# Patient Record
Sex: MALE | Race: BLACK OR AFRICAN AMERICAN
[De-identification: names, ages, dates, MRNs, and addresses within clinical notes are randomized per-mention and may not be internally consistent; named-entity substitution may affect disease eponyms.]

---

## 2017-09-07 NOTE — HP
Date/Time of Note


Date/Time of Note


DATE: 9/7/17 


TIME: 22:19





Assessment/Plan


VTE Prophylaxis


VTE Prophylaxis Intervention:  SCD's





Assessment/Plan


Chief Complaint/Hosp Course


This is a 62 year old male being admitted to the tele floor for:





#1 Suspected New onset CHF: BNP 2,360.  She with a history of congenital heart 

disease as a child.  I did not see an EKG in the transfer documentation will 

order repeat EKG. diffuse crackles/wheezing on lung exam. 2D echo in the AM. 

Cardio consult. Lasix 40 mg IV, patient prefers to receive it in the AM. 

BBlocker/ACE/ASA. Check lipid, tsh, a1c. 





#2 dvt and gi prophylaxis: scds, acid blocker





further treatment strategy as per the clinical course.


Problems:  





HPI/ROS


Admit Date/Time


Admit Date/Time


Sep 7, 2017 at 19:06





Hx of Present Illness


CC: cough x 1 week





This is a 62 year old male who was transferred from [facility] for likely new onset 

chf. Patient states he has a had a cough for about 1 week. Dry cough. Denies 

any fevers. Denies any shortness of breath. Denies any orthopnea. He thinks he 

has a bronchitis.  He does report that when he was a child he was diagnosed 

with an enlarged heart and had a surgery performed.





allergies: nkda





Meds:none





ROS


Const: Negative for fever, chills, weight gain or weight loss, fatigue, or 

diaphoresis


Eyes : No pain discharge or redness or change in visual acuity


ENT: No pain, sore throat, congestion, congestion,  dysphagia or discharge


Respiratory: As per HPI


Cardiovascular: No chest pain, palpitation, PND, or edema


GI : no change in appetite, abdominal pain, nausea, vomiting, diarrhea, 

constipation, or change in the color his stool 


Genitourinary: No dysuria, hematuria, flank pain ,  discharge or CVA tenderness


Musculoskeletal: No joint pain, back pain, neck pain, restricted range of 

motion in neck or joints


Skin: No rash, bruising or hives 


Neuro: No headache, dizziness, syncope, seizure, focal weakness


Endocrine: No polyuria, polydipsia, temperature intolerance


Psych: No hallucination, depression, anxiety or suicidal ideation





PMH/Family/Social


Past Medical History


history of congenital heart disease





Past Surgical History


states he did have some cardiac surgery as a child





Family History


Significant Family History:  no pertinent family hx





Social History


Alcohol Use:  rarely


Smoking Status:  Never smoker


Drug Use:  marijuana





Exam/Review of Systems


Vital Signs


Vitals





 Vital Signs








  Date Time  Temp Pulse Resp B/P Pulse Ox O2 Delivery O2 Flow Rate FiO2


 


9/7/17 20:20  100      


 


9/7/17 20:04 98.4  19 138/91 98   











Exam


Exam





General: Patient is well-developed well-nourished


The patient is alert oriented -3 lying comfortably in bed.


HEENT: Atraumatic, normocephalic. The pupils are equal, round and reactive. 

Extraocular motor are intact


Neck: Supple with full range of motion. No rigidity or meningismus


Chest: Nontender


Lungs: Bilateral crackles and wheezing


Heart: Normal S1-S2, Regular rhythm and rate.  No overt murmurs appreciated


Abdomen: Soft , nontender,  nondistended , bowel sounds are present. No 

guarding no rebound tenderness , No masses or organomegaly. No costovertebral 

temporal angle mass


Extremities: Normal to inspection, no edema no cyanosis


Neurologic: Normal mental status, speech normal, cranial nerves II through XII 

are intact, motor and sensory are intact, no focal weakness


Additional Comments


I do not see an EKG in the transfer documentation.  Will order a repeat EKG











MONTANA SOSA Sep 7, 2017 22:29

## 2017-09-08 NOTE — RADRPT
Echocardiogram Report

 

Patient Name:  ALEJANDRO LARSEN   Gender:       Male

MRN:           [id number]              Accession #:  [id number]

Birth Date:    1955          Study Date:   08-Sep-2017

Sonographer:   JUN Lucas [facility]         Location:     [id number]

 

Ref. Physician: MONTANA SOSA

Quality: Good

 

Procedures: Transthoracic echocardiogram with complete 2D, M-Mode, and 

doppler examination.

Indications: New onset CHF.

 

2D/M Mode                          Doppler

Measurement  Value  Normal Ranges  Measurement     Value  Normal Ranges

LVIDd 2D     6.6    3.5 - 5.6 cm   AV Peak Chris     0.9    m/sec

LVIDs 2D     5.0    2.1 - 4.1 cm   AV Peak PG      3.6    mmHg

LVPWd 2D     1.0    0.6 - 1.1 cm   LVOT Peak Chris   0.7    m/sec

IVSd 2D      1.1    0.6 - 1.1 cm   LVOT Peak PG    2.1    mmHg

AoR Diam 2D  2.9    2.0 - 3.7 cm   MV E Peak Chris   0.8    m/sec

EDV 2D       220.0  cm3            MV A Peak Chris   0.5    m/sec

ESV 2D       126.8  cm3            MV E/A          1.7

LA Dimen 2D  4.1    2.3 - 4.0 cm   MV Decel Time   146    msec

MV Decel Limestone  6

MV E/A          1.7

TR Peak Chris     2.6    m/sec

TR Peak PG      28.0   mmHg

RVSP            31.0   mmHg

 

Findings

Left Ventricle: Mild concentric left ventricular hypertrophy.  Moderate 

enlargement of left ventricle cavity.  Severe left ventricular systolic 

dysfunction.  Ejection fraction is visually estimated at 25 %.  Tissue 

Doppler/Mitral Doppler indices are consistent with pseudonormalization 

with mildly elevated left atrial pressure (Stage II diastolic 

dysfunction).

Right Ventricle: Normal right ventricular size.  Normal right 

ventricular systolic function.

Left Atrium: There is mild enlargement of left atrium.

Right Atrium: The right atrium is normal in size.

Mitral Valve: Mitral valve leaflets appear mildly thickened.  Mild 

mitral annular calcification.  Moderate mitral valve regurgitation.

Aortic Valve: Normal appearance of the aortic valve.  No significant 

aortic stenosis or insufficiency.

Tricuspid Valve: Normal appearance of the tricuspid valve.  Estimated 

peak PA systolic pressure 31 mmHg.  There is mild tricuspid 

regurgitation.

Pulmonic Valve: Pulmonic valve not well visualized.

Pericardium: Normal pericardium with no significant pericardial effusion.

Aorta: Normal aortic root.

IVC: Normal size and normal respiratory collapse consistent with normal 

right atrial pressure.

 

Conclusions

Mild concentric left ventricular hypertrophy.  Moderate enlargement of 

left ventricle cavity.  Severe left ventricular systolic dysfunction.  

Ejection fraction is visually estimated at 25 %.  Tissue Doppler/Mitral 

Doppler indices are consistent with pseudonormalization with mildly 

elevated left atrial pressure (Stage II diastolic dysfunction).

Normal right ventricular size.  Normal right ventricular systolic 

function.

There is mild enlargement of left atrium.

The right atrium is normal in size.

Moderate mitral valve regurgitation.

Estimated peak PA systolic pressure 31 mmHg.  There is mild tricuspid 

regurgitation.

No significant aortic stenosis or insufficiency.

Normal pericardium with no significant pericardial effusion.

 

Electronically Signed By:

Mika Garcia

08-Sep-2017 11:51:40  -0700

 

Patient Name: AMANDA LARSENKELLY

MRN: [id number]

Study Date: 08-Sep-2017

 

[id number]

## 2017-09-08 NOTE — RADRPT
PROCEDURE:   XR Chest.

 

CLINICAL INDICATION:   Congestive heart failure .

 

TECHNIQUE:   Single frontal chest x-ray. 

 

COMPARISON:   None.

 

FINDINGS:

The lungs are clear of acute infiltrates, edema, effusions, or masses..  Cardiomegaly is seen.. The 
osseous structures are intact.   

 

IMPRESSION:

No acute cardiopulmonary disease. 

Cardiomegaly.  

 

RPTAT: JJ

_____________________________________________ 

.Andrea Mcgregor MD, MD           Date    Time 

Electronically viewed and signed by .Andrea Mcgregor MD, MD on 09/08/2017 14:18 

 

D:  09/08/2017 14:18  T:  09/08/2017 14:18

.L/

## 2017-09-08 NOTE — PN
Date/Time of Note


Date/Time of Note


DATE: 9/8/17 


TIME: 16:19





Assessment/Plan


VTE Prophylaxis


VTE Prophylaxis Intervention:  LMWH





Lines/Catheters


IV Catheter Type (from Nrs):  Saline Lock


Urinary Cath still in place:  No





Assessment/Plan


Chief Complaint/Hosp Course


61 yo male who presents with acute onset systolic CHF exacerbation





Acute systolic CHF exacerbation:


- Volume status pretty good, can continue a bit more to euvolemia


- BB, ACE, aarti indicated


- New diagnosis for him, needs ischemic evaluation at some point


Problems:  





Subjective


24 Hr Interval Summary


Free Text/Dictation


Only complaint is of cough, minimally productive.  Gets adominal pain from 

splinting w cough


TTE shows EF 25%





Exam/Review of Systems


Vital Signs


Vitals





 Vital Signs








  Date Time  Temp Pulse Resp B/P Pulse Ox O2 Delivery O2 Flow Rate FiO2


 


9/8/17 14:56 98.0 88 19 107/66 100   











Exam


Well appearing, NAD, AOx3


JVD mildly eleavted


Tachy, regular, S4 present


Clear lugns, no wheezign


No perihperal edema





Results


Result Diagram:  


9/8/17 0700                                                                    

            9/8/17 0700





Results 24 hrs





Laboratory Tests








Test


  9/7/17


22:27 9/8/17


07:00


 


Creatine Kinase 120   104  


 


Creatine Kinase Index 0.8   1.2  


 


Creatinine Kinase MB (Mass) 1.01   1.21  


 


Troponin I < 0.012   < 0.012  


 


White Blood Count  17.3  H


 


Red Blood Count  5.14  


 


Hemoglobin  15.3  


 


Hematocrit  46.2  


 


Mean Corpuscular Volume  89.9  


 


Mean Corpuscular Hemoglobin  29.8  


 


Mean Corpuscular Hemoglobin


Concent 


  33.1  


 


 


Red Cell Distribution Width  12.2  


 


Platelet Count  242  


 


Mean Platelet Volume  10.9  H


 


Neutrophils %  88.2  H


 


Lymphocytes %  5.1  L


 


Monocytes %  6.1  


 


Eosinophils %  0.0  


 


Basophils %  0.1  


 


Nucleated Red Blood Cells %  0.0  


 


Neutrophils # (Manual)  15.3  H


 


Lymphocytes #  0.9  


 


Monocytes #  1.1  H


 


Eosinophils #  0.0  


 


Basophils #  0.0  


 


Nucleated Red Blood Cells #  0.0  


 


Sodium Level  138  


 


Potassium Level  4.1  


 


Chloride Level  100  


 


Carbon Dioxide Level  31  


 


Anion Gap  11  


 


Blood Urea Nitrogen  14  


 


Creatinine  0.96  


 


Glucose Level  128  


 


Hemoglobin A1c  5.5  


 


Calcium Level  9.4  


 


Magnesium Level  2.0  


 


Total Bilirubin  0.5  


 


Direct Bilirubin  0.00  


 


Indirect Bilirubin  0.5  


 


Aspartate Amino Transf


(AST/SGOT) 


  19  


 


 


Alanine Aminotransferase


(ALT/SGPT) 


  23  


 


 


Alkaline Phosphatase  87  


 


Total Protein  7.4  


 


Albumin  3.6  


 


Globulin  3.80  H


 


Albumin/Globulin Ratio  0.94  


 


Triglycerides Level  39  


 


Cholesterol Level  126  


 


LDL Cholesterol, Calculated  70  


 


HDL Cholesterol  48  


 


Cholesterol/HDL Ratio  2.6  


 


Thyroid Stimulating Hormone


(TSH) 


  0.461  L


 


 


Free Thyroxine  0.89  


 


Free Triiodothyronine (T3)


pg/mL 


  3.44  


 











Medications


Medications





 Current Medications


Ondansetron HCl (Zofran Inj) 4 mg Q6H  PRN IV NAUSEA AND/OR VOMITING;  Start 9/7 /17 at 22:30


Aspirin (Aspirin) 81 mg DAILY PO  Last administered on 9/8/17at 09:06; Admin 

Dose 81 MG;  Start 9/8/17 at 09:00


Docusate Sodium (Colace) 100 mg Q12H  PRN PO CONSTIPATION;  Start 9/7/17 at 22:

30


Bisacodyl (Dulcolax) 5 mg DAILY  PRN PO CONSTIPATION;  Start 9/7/17 at 22:30


Famotidine (Pepcid) 20 mg Q12 PO  Last administered on 9/8/17at 09:07; Admin 

Dose 20 MG;  Start 9/8/17 at 09:00


Carvedilol (Coreg) 3.125 mg BID PO  Last administered on 9/8/17at 09:07; Admin 

Dose 3.125 MG;  Start 9/7/17 at 22:30


Guaifenesin/ Dextromethorphan (Robitussin Dm Liquid Cup) 10 ml Q4H  PRN PO 

COUGH Last administered on 9/8/17at 09:08; Admin Dose 10 ML;  Start 9/7/17 at 22

:30


Furosemide (Lasix) 40 mg DAILY IV ;  Start 9/9/17 at 09:00


Lisinopril (Zestril) 5 mg BID PO ;  Start 9/9/17 at 09:00


Atorvastatin Calcium (Lipitor) 20 mg HS PO ;  Start 9/8/17 at 21:00


Promethazine HCl/ Codeine (Phenergan/ Codeine) 5 ml Q4H  PRN PO COUGH Last 

administered on 9/8/17at 13:45; Admin Dose 5 ML;  Start 9/8/17 at 12:30











AMY MAURICE MD Sep 8, 2017 16:22

## 2017-09-08 NOTE — CONS
Date/Time of Note


Date/Time of Note


DATE: 9/8/17 


TIME: 11:55





Assessment/Plan


Assessment/Plan


Additional Assessment/Plan


Acute decompensated systolic congestive heart failure


Cardiomyopathy with ejection fraction 25%


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


Moderate mitral valve regurgitation


Mild tricuspid valve regurgitation





-Patient transferred from outside facility secondary to cough and shortness of 

breath for 1 week.  BMP was elevated above 2000 based on transfer records and 

chest x-ray with evidence of pulmonary vascular congestion.  In discussion with 

patient, at the age of 6, he did have congenital cardiac surgery and has not 

seen a cardiologist in adulthood.  Growing up, he denied any exertional 

shortness of breath or chest pain.  His current echocardiogram with severe 

cardiomyopathy with ejection fraction 25%.  Patient has already been started on 

beta-blocker and ACE inhibitor.  Would titrate as tolerated.  Continue diuretic 

therapy, maintain potassium above 4.0 and magnesium above 2.0.





Consultation Date/Type/Reason


Admit Date/Time


Sep 7, 2017 at 19:06


Type of Consultation:  cv


Reason for Consultation


Cough and shortness of breath for 1 week





Hx of Present Illness


This is a 62-year-old male with a distant history of congenital cardiac surgery 

at the age of 6 who presents with symptoms of cough and shortness of breath for 

1 week.  Patient main complaint has been a productive cough with phlegm 

production.  He gets occasional shortness of breath with the coughing as well 

as chest discomfort which is began after few days of coughing.  Chest 

discomfort is only with coughing.  He otherwise denies exertional chest 

discomfort, dizziness or lightheadedness.  Denies lower extremity edema.  

Denies fevers or chills.  He recently presented to an outside facility but 

secondary to insurance reasons, was transferred to our facility.  He does feel 

better after hospital admission.  Denies any previous history of known 

congestive heart failure.


12 point review of systems was performed with all pertinent positives and 

negatives mentioned above and all else is negative





Past Medical History


Congenital cardiac





Past Surgical History


Congenital cardiac surgery at the age of 6





Family History


Significant Family History:  no pertinent family hx





Social History


Alcohol Use:  rarely


Smoking Status:  Never smoker


Drug Use:  marijuana


Other Social History


Works in security





Exam/Review of Systems


Vital Signs


Vitals





 Vital Signs








  Date Time  Temp Pulse Resp B/P Pulse Ox O2 Delivery O2 Flow Rate FiO2


 


9/8/17 11:20 97.8 103 20 117/77 97   











Exam


No Apparent distress, no dyspnea with speaking


Constitutional:  alert, oriented, well developed


Head:  normocephalic


Neck:  supple


Respiratory:  other (Coarse breath sounds bilaterally, no wheezing)


Cardiovascular:  other (S1-S2 heard), regular rate and rhythm, systolic murmur


Gastrointestinal:  bowel sounds, non-tender, soft


Extremities:  edema (Trace)





Results


Result Diagram:  


9/8/17 0700                                                                    

            9/8/17 0700





Results 24 hrs





Laboratory Tests








Test


  9/7/17


22:27 9/8/17


07:00


 


Creatine Kinase 120   104  


 


Creatine Kinase Index 0.8   1.2  


 


Creatinine Kinase MB (Mass) 1.01   1.21  


 


Troponin I < 0.012   < 0.012  


 


White Blood Count  17.3  H


 


Red Blood Count  5.14  


 


Hemoglobin  15.3  


 


Hematocrit  46.2  


 


Mean Corpuscular Volume  89.9  


 


Mean Corpuscular Hemoglobin  29.8  


 


Mean Corpuscular Hemoglobin


Concent 


  33.1  


 


 


Red Cell Distribution Width  12.2  


 


Platelet Count  242  


 


Mean Platelet Volume  10.9  H


 


Neutrophils %  88.2  H


 


Lymphocytes %  5.1  L


 


Monocytes %  6.1  


 


Eosinophils %  0.0  


 


Basophils %  0.1  


 


Nucleated Red Blood Cells %  0.0  


 


Neutrophils # (Manual)  15.3  H


 


Lymphocytes #  0.9  


 


Monocytes #  1.1  H


 


Eosinophils #  0.0  


 


Basophils #  0.0  


 


Nucleated Red Blood Cells #  0.0  


 


Sodium Level  138  


 


Potassium Level  4.1  


 


Chloride Level  100  


 


Carbon Dioxide Level  31  


 


Anion Gap  11  


 


Blood Urea Nitrogen  14  


 


Creatinine  0.96  


 


Glucose Level  128  


 


Hemoglobin A1c  5.5  


 


Calcium Level  9.4  


 


Magnesium Level  2.0  


 


Total Bilirubin  0.5  


 


Direct Bilirubin  0.00  


 


Indirect Bilirubin  0.5  


 


Aspartate Amino Transf


(AST/SGOT) 


  19  


 


 


Alanine Aminotransferase


(ALT/SGPT) 


  23  


 


 


Alkaline Phosphatase  87  


 


Total Protein  7.4  


 


Albumin  3.6  


 


Globulin  3.80  H


 


Albumin/Globulin Ratio  0.94  


 


Triglycerides Level  39  


 


Cholesterol Level  126  


 


LDL Cholesterol, Calculated  70  


 


HDL Cholesterol  48  


 


Cholesterol/HDL Ratio  2.6  


 


Thyroid Stimulating Hormone


(TSH) 


  0.461  L


 











Medications


Medications





 Current Medications


Ondansetron HCl (Zofran Inj) 4 mg Q6H  PRN IV NAUSEA AND/OR VOMITING;  Start 9/7 /17 at 22:30


Aspirin (Aspirin) 81 mg DAILY PO  Last administered on 9/8/17at 09:06; Admin 

Dose 81 MG;  Start 9/8/17 at 09:00


Docusate Sodium (Colace) 100 mg Q12H  PRN PO CONSTIPATION;  Start 9/7/17 at 22:

30


Bisacodyl (Dulcolax) 5 mg DAILY  PRN PO CONSTIPATION;  Start 9/7/17 at 22:30


Famotidine (Pepcid) 20 mg Q12 PO  Last administered on 9/8/17at 09:07; Admin 

Dose 20 MG;  Start 9/8/17 at 09:00


Carvedilol (Coreg) 3.125 mg BID PO  Last administered on 9/8/17at 09:07; Admin 

Dose 3.125 MG;  Start 9/7/17 at 22:30


Lisinopril (Zestril) 10 mg DAILY PO  Last administered on 9/8/17at 09:07; Admin 

Dose 10 MG;  Start 9/8/17 at 09:00


Guaifenesin/ Dextromethorphan (Robitussin Dm Liquid Cup) 10 ml Q4H  PRN PO 

COUGH Last administered on 9/8/17at 09:08; Admin Dose 10 ML;  Start 9/7/17 at 22

:30











Mika Garcia DO Sep 8, 2017 12:02

## 2017-09-08 NOTE — RADRPT
Vent Rate: 97 bpm

RR Interval: 0 msec

SC Interval: 144 msec

QRS Duration: 102 msec

QT Interval: 386 msec

QTC Interval: 490 msec

P-R-T Axis: 83 - 69 - 122 degrees

 

 Normal sinus rhythm

 Right atrial enlargement

 Voltage criteria for left ventricular hypertrophy

 T wave abnormality, consider lateral ischemia

 Prolonged QT

 Abnormal ECG

 

Electronically Signed By: Jeff Vallejo

[id number]

## 2017-09-09 NOTE — RADRPT
PROCEDURE:   XR Chest. 

 

CLINICAL INDICATION:   Shortness of breath.

 

TECHNIQUE:   Single frontal view. 

 

COMPARISON:   09/08/2017. 

 

FINDINGS:

There is patchy air space disease at the right lung base consistent with pneumonia. The lungs are ot
herwise clear. 

The heart size is normal. 

There is no pleural effusion. 

There is no pneumothorax.   

 

IMPRESSION:

1.  Mild right basilar pneumonia.

2.  Otherwise normal chest x-ray.

 

RPTAT: QQ

_____________________________________________ 

.Krystian Pino MD, MD           Date    Time 

Electronically viewed and signed by .Krystian Pino MD, MD on 09/09/2017 08:45 

 

D:  09/09/2017 08:45  T:  09/09/2017 08:45

.R/

## 2017-09-09 NOTE — PN
Date/Time of Note


Date/Time of Note


DATE: 9/9/17 


TIME: 10:14





Assessment/Plan


VTE Prophylaxis


VTE Prophylaxis Intervention:  SCD's





Lines/Catheters


IV Catheter Type (from [facility]):  Saline Lock


Urinary Cath still in place:  No





Assessment/Plan


Assessment/Plan


Acute decompensated systolic congestive heart failure


Cardiomyopathy with ejection fraction 25%


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


Moderate mitral valve regurgitation


Mild tricuspid valve regurgitation





 His current echocardiogram with severe cardiomyopathy with ejection fraction 25

%.  Patient has already been started on beta-blocker and ACE inhibitor.  Would 

titrate as tolerated.  Continue diuretic therapy, maintain potassium above 4.0 

and magnesium above 2.0.


8 beat NSVT overnight


continujhe cardiac meds





Subjective


24 Hr Interval Summary


Free Text/Dictation


The patient with no change





Exam/Review of Systems


Vital Signs


Vitals





 Vital Signs








  Date Time  Temp Pulse Resp B/P Pulse Ox O2 Delivery O2 Flow Rate FiO2


 


9/9/17 08:00  88      


 


9/9/17 07:18 98.8  20 112/72 96   














 Intake and Output   


 


 9/8/17 9/8/17 9/9/17





 15:00 23:00 07:00


 


Intake Total  950 ml 250 ml


 


Balance  950 ml 250 ml











Results


Result Diagram:  


9/9/17 0633                                                                    

            9/9/17 0633





Results 24 hrs





Laboratory Tests








Test


  9/9/17


06:33


 


White Blood Count 11.0  #H


 


Red Blood Count 5.10  


 


Hemoglobin 15.0  


 


Hematocrit 46.0  


 


Mean Corpuscular Volume 90.2  


 


Mean Corpuscular Hemoglobin 29.4  


 


Mean Corpuscular Hemoglobin


Concent 32.6  


 


 


Red Cell Distribution Width 12.6  


 


Platelet Count 248  


 


Mean Platelet Volume 11.0  H


 


Neutrophils % 71.5  


 


Lymphocytes % 17.0  


 


Monocytes % 10.3  


 


Eosinophils % 0.2  


 


Basophils % 0.5  


 


Nucleated Red Blood Cells % 0.0  


 


Neutrophils # (Manual) 7.9  H


 


Lymphocytes # 1.9  


 


Monocytes # 1.1  H


 


Eosinophils # 0.0  


 


Basophils # 0.1  


 


Nucleated Red Blood Cells # 0.0  


 


Sodium Level 136  


 


Potassium Level 3.8  


 


Chloride Level 99  


 


Carbon Dioxide Level 31  


 


Anion Gap 10  


 


Blood Urea Nitrogen 18  


 


Creatinine 1.04  


 


Glucose Level 97  


 


Calcium Level 9.3  


 


Magnesium Level 1.9  


 


Total Bilirubin 0.6  


 


Direct Bilirubin 0.00  


 


Indirect Bilirubin 0.6  


 


Aspartate Amino Transf


(AST/SGOT) 23  


 


 


Alanine Aminotransferase


(ALT/SGPT) 28  


 


 


Alkaline Phosphatase 81  


 


Total Protein 7.2  


 


Albumin 3.6  


 


Globulin 3.60  H


 


Albumin/Globulin Ratio 1.00  











Medications


Medications





 Current Medications


Ondansetron HCl (Zofran Inj) 4 mg Q6H  PRN IV NAUSEA AND/OR VOMITING;  Start 9/7 /17 at 22:30


Aspirin (Aspirin) 81 mg DAILY PO  Last administered on 9/9/17at 08:46; Admin 

Dose 81 MG;  Start 9/8/17 at 09:00


Docusate Sodium (Colace) 100 mg Q12H  PRN PO CONSTIPATION;  Start 9/7/17 at 22:

30


Bisacodyl (Dulcolax) 5 mg DAILY  PRN PO CONSTIPATION;  Start 9/7/17 at 22:30


Famotidine (Pepcid) 20 mg Q12 PO  Last administered on 9/9/17at 08:47; Admin 

Dose 20 MG;  Start 9/8/17 at 09:00


Carvedilol (Coreg) 3.125 mg BID PO  Last administered on 9/9/17at 08:46; Admin 

Dose 3.125 MG;  Start 9/7/17 at 22:30


Guaifenesin/ Dextromethorphan (Robitussin Dm Liquid Cup) 10 ml Q4H  PRN PO 

COUGH Last administered on 9/8/17at 09:08; Admin Dose 10 ML;  Start 9/7/17 at 22

:30


Furosemide (Lasix) 40 mg DAILY IV  Last administered on 9/9/17at 08:48; Admin 

Dose 40 MG;  Start 9/9/17 at 09:00


Lisinopril (Zestril) 5 mg BID PO  Last administered on 9/9/17at 08:47; Admin 

Dose 5 MG;  Start 9/9/17 at 09:00


Atorvastatin Calcium (Lipitor) 20 mg HS PO  Last administered on 9/8/17at 20:44

; Admin Dose 20 MG;  Start 9/8/17 at 21:00


Promethazine HCl/ Codeine (Phenergan/ Codeine) 5 ml Q4H  PRN PO COUGH Last 

administered on 9/8/17at 23:29; Admin Dose 5 ML;  Start 9/8/17 at 12:30


Acetaminophen (Tylenol Tab) 650 mg Q4H  PRN PO PAIN AND OR ELEVATED TEMP Last 

administered on 9/9/17at 01:23; Admin Dose 650 MG;  Start 9/9/17 at 01:30


Morphine Sulfate (morphine) 2 mg Q4H  PRN IV PAIN;  Start 9/9/17 at 02:00











YUNI CLEMENTE MD Sep 9, 2017 10:15

## 2017-09-09 NOTE — PN
Date/Time of Note


Date/Time of Note


DATE: 9/9/17 


TIME: 17:15





Assessment/Plan


VTE Prophylaxis


VTE Prophylaxis Intervention:  LMWH





Lines/Catheters


IV Catheter Type (from Nrs):  Saline Lock


Urinary Cath still in place:  No





Assessment/Plan


Chief Complaint/Hosp Course


63 yo male who presents with acute onset systolic CHF exacerbation





Acute systolic CHF exacerbation:


New diagnosis of systolic cardiomyopathy: ddx ischemic, perhaps this is a 

myocarditis with URI symptmos present


- Volume status pretty good, can continue a bit more to euvolemia


- BB, ACE, aarti indicated


- New diagnosis for him, needs ischemic evaluation at some point


- Nonischemic evaluation as well, cMRI would be nice


- Await iron studies


- Will need to have ICD evaluation follow approripate course of neurohormonal 

blockade as an outpateient


Problems:  





Subjective


24 Hr Interval Summary


Free Text/Dictation


TTE showed reduced systolic funciton and dilated LV





Patient feeling better after diruesis.  Still has some cough but less than 

previously





Exam/Review of Systems


Vital Signs


Vitals





 Vital Signs








  Date Time  Temp Pulse Resp B/P Pulse Ox O2 Delivery O2 Flow Rate FiO2


 


9/9/17 16:00  95      


 


9/9/17 15:10 98.1  20 111/73 97   














 Intake and Output   


 


 9/8/17 9/8/17 9/9/17





 15:00 23:00 07:00


 


Intake Total  950 ml 250 ml


 


Balance  950 ml 250 ml











Exam


Well appearing, no distress


Pleasant, AOx3


CV: JVP flat, RRR


Lungs clear, no whezing


Abdomen soft nt nd


Ext whitout edema





Results


Result Diagram:  


9/9/17 0633                                                                    

            9/9/17 0633





Results 24 hrs





Laboratory Tests








Test


  9/9/17


06:33 9/9/17


11:28 9/9/17


11:30


 


White Blood Count 11.0  #H  


 


Red Blood Count 5.10    


 


Hemoglobin 15.0    


 


Hematocrit 46.0    


 


Mean Corpuscular Volume 90.2    


 


Mean Corpuscular Hemoglobin 29.4    


 


Mean Corpuscular Hemoglobin


Concent 32.6  


  


  


 


 


Red Cell Distribution Width 12.6    


 


Platelet Count 248    


 


Mean Platelet Volume 11.0  H  


 


Neutrophils % 71.5    


 


Lymphocytes % 17.0    


 


Monocytes % 10.3    


 


Eosinophils % 0.2    


 


Basophils % 0.5    


 


Nucleated Red Blood Cells % 0.0    


 


Neutrophils # (Manual) 7.9  H  


 


Lymphocytes # 1.9    


 


Monocytes # 1.1  H  


 


Eosinophils # 0.0    


 


Basophils # 0.1    


 


Nucleated Red Blood Cells # 0.0    


 


Sodium Level 136    


 


Potassium Level 3.8    


 


Chloride Level 99    


 


Carbon Dioxide Level 31    


 


Anion Gap 10    


 


Blood Urea Nitrogen 18    


 


Creatinine 1.04    


 


Glucose Level 97    


 


Calcium Level 9.3    


 


Magnesium Level 1.9    


 


Total Bilirubin 0.6    


 


Direct Bilirubin 0.00    


 


Indirect Bilirubin 0.6    


 


Aspartate Amino Transf


(AST/SGOT) 23  


  


  


 


 


Alanine Aminotransferase


(ALT/SGPT) 28  


  


  


 


 


Alkaline Phosphatase 81    


 


Total Protein 7.2    


 


Albumin 3.6    


 


Globulin 3.60  H  


 


Albumin/Globulin Ratio 1.00    


 


Iron Level  < 10  L 


 


Total Iron Binding Capacity  202  L 


 


Percent Iron Saturation     


 


Ferritin  225.0   


 


Triglycerides Level   74  


 


Cholesterol Level   117  


 


LDL Cholesterol, Calculated   60  


 


HDL Cholesterol   42  


 


Cholesterol/HDL Ratio   2.7  











Medications


Medications





 Current Medications


Ondansetron HCl (Zofran Inj) 4 mg Q6H  PRN IV NAUSEA AND/OR VOMITING;  Start 9/7 /17 at 22:30


Aspirin (Aspirin) 81 mg DAILY PO  Last administered on 9/9/17at 08:46; Admin 

Dose 81 MG;  Start 9/8/17 at 09:00


Docusate Sodium (Colace) 100 mg Q12H  PRN PO CONSTIPATION;  Start 9/7/17 at 22:

30


Bisacodyl (Dulcolax) 5 mg DAILY  PRN PO CONSTIPATION;  Start 9/7/17 at 22:30


Famotidine (Pepcid) 20 mg Q12 PO  Last administered on 9/9/17at 08:47; Admin 

Dose 20 MG;  Start 9/8/17 at 09:00


Guaifenesin/ Dextromethorphan (Robitussin Dm Liquid Cup) 10 ml Q4H  PRN PO 

COUGH Last administered on 9/8/17at 09:08; Admin Dose 10 ML;  Start 9/7/17 at 22

:30


Furosemide (Lasix) 40 mg DAILY IV  Last administered on 9/9/17at 08:48; Admin 

Dose 40 MG;  Start 9/9/17 at 09:00


Lisinopril (Zestril) 5 mg BID PO  Last administered on 9/9/17at 08:47; Admin 

Dose 5 MG;  Start 9/9/17 at 09:00


Atorvastatin Calcium (Lipitor) 20 mg HS PO  Last administered on 9/8/17at 20:44

; Admin Dose 20 MG;  Start 9/8/17 at 21:00


Promethazine HCl/ Codeine (Phenergan/ Codeine) 5 ml Q4H  PRN PO COUGH Last 

administered on 9/9/17at 14:31; Admin Dose 5 ML;  Start 9/8/17 at 12:30


Acetaminophen (Tylenol Tab) 650 mg Q4H  PRN PO PAIN AND OR ELEVATED TEMP Last 

administered on 9/9/17at 01:23; Admin Dose 650 MG;  Start 9/9/17 at 01:30


Morphine Sulfate (morphine) 2 mg Q4H  PRN IV PAIN;  Start 9/9/17 at 02:00


Carvedilol (Coreg) 6.25 mg BID PO ;  Start 9/9/17 at 21:00


Spironolactone (Aldactone) 25 mg DAILY PO  Last administered on 9/9/17at 14:28; 

Admin Dose 25 MG;  Start 9/9/17 at 13:30











AMY MAURICE MD Sep 9, 2017 17:17

## 2017-09-10 NOTE — PN
Date/Time of Note


Date/Time of Note


DATE: 9/10/17 


TIME: 17:15





Assessment/Plan


VTE Prophylaxis


VTE Prophylaxis Intervention:  LMWH





Lines/Catheters


IV Catheter Type (from [facility]):  Saline Lock


Urinary Cath still in place:  No





Assessment/Plan


Chief Complaint/Hosp Course


63 yo male who presents with acute onset systolic CHF exacerbation





Acute systolic CHF exacerbation:


- Likely nonischemic, perhaps a myocarditis in setting of URI.  Shoudl have 

ischemic evaluation per cardiology at some point


- Volume status pretty good, can continue a bit more to euvolemia


- BB, ACE, aarti indicated


- Will need to have ICD evaluation follow approripate course of neurohormonal 

blockade as an outpateient





Community acquired pneumonia:


- Given elevatged temps, WBC and infiltrate will give a trial of levaquin





Discharge likely tomorrow to self care


Needs cardiologist who will accept [facility]


Problems:  





Subjective


24 Hr Interval Summary


Free Text/Dictation


Feeling well today


Still with bothersome cough, with low grade temps and CXR concernign for 

pneumoina levquin has been started


No dyspena at all





Exam/Review of Systems


Vital Signs


Vitals





 Vital Signs








  Date Time  Temp Pulse Resp B/P Pulse Ox O2 Delivery O2 Flow Rate FiO2


 


9/10/17 15:41 97.5 79 19 125/70 96   














 Intake and Output   


 


 9/9/17 9/9/17 9/10/17





 15:00 23:00 07:00


 


Intake Total  1100 ml 


 


Balance  1100 ml 











Exam


Mild JVD


Clear lungs, no wheezign


No edema





Results


Result Diagram:  


9/9/17 0633                                                                    

            9/10/17 1107





Results 24 hrs





Laboratory Tests








Test


  9/10/17


05:53 9/10/17


05:55 9/10/17


11:07


 


Iron Level 16  #L  


 


Total Iron Binding Capacity 195  L  


 


Percent Iron Saturation 8  L  


 


Ferritin 271.0  H  


 


HIV (1&2) Antibody  NEGATIVE   


 


Sodium Level   136  


 


Potassium Level   3.7  


 


Chloride Level   97  


 


Carbon Dioxide Level   31  


 


Anion Gap   12  


 


Blood Urea Nitrogen   16  


 


Creatinine   1.14  


 


Glucose Level   146  #


 


Calcium Level   9.3  


 


Total Bilirubin   0.6  


 


Direct Bilirubin   0.00  


 


Indirect Bilirubin   0.6  


 


Aspartate Amino Transf


(AST/SGOT) 


  


  36  #


 


 


Alanine Aminotransferase


(ALT/SGPT) 


  


  37  


 


 


Alkaline Phosphatase   86  


 


C-Reactive Protein   7.2  H


 


Total Protein   7.9  


 


Albumin   3.9  


 


Globulin   4.00  H


 


Albumin/Globulin Ratio   0.97  











Medications


Medications





 Current Medications


Ondansetron HCl (Zofran Inj) 4 mg Q6H  PRN IV NAUSEA AND/OR VOMITING;  Start 9/7 /17 at 22:30


Aspirin (Aspirin) 81 mg DAILY PO  Last administered on 9/10/17at 08:42; Admin 

Dose 81 MG;  Start 9/8/17 at 09:00


Docusate Sodium (Colace) 100 mg Q12H  PRN PO CONSTIPATION;  Start 9/7/17 at 22:

30


Bisacodyl (Dulcolax) 5 mg DAILY  PRN PO CONSTIPATION;  Start 9/7/17 at 22:30


Famotidine (Pepcid) 20 mg Q12 PO  Last administered on 9/10/17at 08:43; Admin 

Dose 20 MG;  Start 9/8/17 at 09:00


Guaifenesin/ Dextromethorphan (Robitussin Dm Liquid Cup) 10 ml Q4H  PRN PO 

COUGH Last administered on 9/8/17at 09:08; Admin Dose 10 ML;  Start 9/7/17 at 22

:30


Furosemide (Lasix) 40 mg DAILY IV  Last administered on 9/10/17at 08:46; Admin 

Dose 40 MG;  Start 9/9/17 at 09:00


Lisinopril (Zestril) 5 mg BID PO  Last administered on 9/10/17at 08:44; Admin 

Dose 5 MG;  Start 9/9/17 at 09:00


Atorvastatin Calcium (Lipitor) 20 mg HS PO  Last administered on 9/9/17at 20:39

; Admin Dose 20 MG;  Start 9/8/17 at 21:00


Promethazine HCl/ Codeine (Phenergan/ Codeine) 5 ml Q4H  PRN PO COUGH Last 

administered on 9/9/17at 22:17; Admin Dose 5 ML;  Start 9/8/17 at 12:30


Acetaminophen (Tylenol Tab) 650 mg Q4H  PRN PO PAIN AND OR ELEVATED TEMP Last 

administered on 9/9/17at 22:38; Admin Dose 650 MG;  Start 9/9/17 at 01:30


Morphine Sulfate (morphine) 2 mg Q4H  PRN IV PAIN;  Start 9/9/17 at 02:00


Carvedilol (Coreg) 6.25 mg BID PO  Last administered on 9/10/17at 08:44; Admin 

Dose 6.25 MG;  Start 9/9/17 at 21:00


Spironolactone (Aldactone) 25 mg DAILY PO  Last administered on 9/10/17at 08:43

; Admin Dose 25 MG;  Start 9/9/17 at 13:30


Levofloxacin (Levaquin) 750 mg DAILY@06 NGT  Last administered on 9/10/17at 10:

38; Admin Dose 750 MG;  Start 9/10/17 at 09:00











AMY MAURICE MD Sep 10, 2017 17:17

## 2017-09-10 NOTE — CONS
DATE OF ADMISSION:  09/07/2017

 

DATE OF CONSULTATION:  09/10/2017

 

HISTORY OF PRESENT ILLNESS:  The patient was admitted to [facility] on September 7 after being transferred from

an outside hospital with a complaint of shortness of breath x1

week.  Here, he was noted to have congestive heart failure with

severely reduced ejection fraction.  He has improved moderately

with diuresis, but does report that he still has a persistent

cough.

 

He has a history of a congenital surgery approximately 50 years

ago in childhood, but he is unsure of what type of surgery he

had.

 

PAST MEDICAL HISTORY:  Cardiac surgery at age 6.

 

FAMILY HISTORY:  Noncontributory.

 

SOCIAL HISTORY:  Occasional marijuana use.

 

PHYSICAL EXAMINATION:  No distress.

VITAL SIGNS:  Temperature 98.4, pulse 95, blood pressure 126/81,

ox saturation 96 percent.

NECK:  No jugular venous distention.

LUNGS:  Clear.

HEART:  Was regular rate and rhythm.  Sternotomy scar noted.

ABDOMEN:  Soft.

EXTREMITIES:  No edema.

 

MEDICATION:  Levaquin, carvedilol, spironolactone, furosemide,

lisinopril, aspirin.

 

DIAGNOSTIC STUDIES:  EKG shows sinus rhythm, with no ST changes.

Telemetry shows several episodes of nonsustained ventricular

tachycardia, none lasting longer than several seconds.

 

Echocardiogram shows severely reduced ejection fraction of 25

percent.  Moderate mitral regurgitation.

 

IMPRESSION:

1.   Severe cardiomyopathy, likely nonischemic.  The patient had

  a cardiac surgery at age 6, which is unknown, although no obvious

  abnormalities on echocardiography as reported by Dr. Garcia.

2.   Congestive heart failure, appears well managed.

 

PLAN:  With regards to the patient's nonsustained ventricular

tachycardia, I believe the best course is to continue with

optimization of his heart failure status and initiation of

cardiac medications, including ACE inhibitor and beta blocker,

which have already been started.

 

He will need a full evaluation and trial of medical therapy with

repeat evaluation of his ejection fraction.  However, if after

the above is completed and his ejection fraction remains reduced,

consideration for cardioverter-defibrillator for primary

prevention of sudden cardiac death is certainly reasonable.

 

At this time, he continues on diuresis and continues to

clinically improve.  We will continue IV diuresis for at least 1

more day.

 

 

 

Dictated By:  Alberto Carl MD

 

/jesus/randall

Job#:  [id number]/Document#:  [id number]

D:  09/10/2017 10:49

T:  09/10/2017 13:23

## 2017-09-11 NOTE — PN
Date/Time of Note


Date/Time of Note


DATE: 9/11/17 


TIME: 14:58





Assessment/Plan


VTE Prophylaxis


VTE Prophylaxis Intervention:  SCD's





Lines/Catheters


IV Catheter Type (from [facility]):  Saline Lock


Urinary Cath still in place:  No





Assessment/Plan


Chief Complaint/Hosp Course


Acute systolic CHF exacerbation: Improved


- Likely nonischemic, perhaps a myocarditis in setting of URI.  Should have 

ischemic evaluation per cardiology at some point


-Continue diuresis


- BB, ACE, aarti indicated


- Will need to have ICD evaluation follow appropriate course of neurohormonal 

blockade as an outpatient





Community acquired pneumonia: She with persistent cough


-Continue Levaquin





Discharge: Patient not stable for discharge at this time, possible DC in 1-2 

days


Needs cardiologist who will accept [facility]





Prophylaxis: SCDs


Problems:  





Subjective


24 Hr Interval Summary


Respiratory:  cough





Exam/Review of Systems


Vital Signs


Vitals





 Vital Signs








  Date Time  Temp Pulse Resp B/P Pulse Ox O2 Delivery O2 Flow Rate FiO2


 


9/11/17 12:53  91      


 


9/11/17 11:19 98.7  20 102/69 95   














 Intake and Output   


 


 9/10/17 9/10/17 9/11/17





 14:59 22:59 06:59


 


Intake Total  1200 ml 900 ml


 


Balance  1200 ml 900 ml











Exam


Constitutional:  alert, oriented


Respiratory:  congested cough


Cardiovascular:  regular rate and rhythm


Gastrointestinal:  soft, 


   No distended


Musculoskeletal:  nl extremities to inspection





Results


Result Diagram:  


9/11/17 0646                                                                   

             9/11/17 0646





Results 24 hrs





Laboratory Tests








Test


  9/11/17


06:46


 


White Blood Count 9.9  


 


Red Blood Count 5.00  


 


Hemoglobin 15.0  


 


Hematocrit 45.6  


 


Mean Corpuscular Volume 91.2  


 


Mean Corpuscular Hemoglobin 30.0  


 


Mean Corpuscular Hemoglobin


Concent 32.9  


 


 


Red Cell Distribution Width 12.0  


 


Platelet Count 276  


 


Mean Platelet Volume 10.7  H


 


Neutrophils % 66.6  


 


Lymphocytes % 17.3  


 


Monocytes % 14.0  H


 


Eosinophils % 0.8  


 


Basophils % 0.7  


 


Nucleated Red Blood Cells % 0.0  


 


Neutrophils # (Manual) 6.6  


 


Lymphocytes # 1.7  


 


Monocytes # 1.4  H


 


Eosinophils # 0.1  


 


Basophils # 0.1  


 


Nucleated Red Blood Cells # 0.0  


 


Sodium Level 136  


 


Potassium Level 4.1  


 


Chloride Level 98  


 


Carbon Dioxide Level 31  


 


Anion Gap 11  


 


Blood Urea Nitrogen 16  


 


Creatinine 1.10  


 


Glucose Level 97  #


 


Calcium Level 9.6  


 


Magnesium Level 2.1  











Medications


Medications





 Current Medications


Ondansetron HCl (Zofran Inj) 4 mg Q6H  PRN IV NAUSEA AND/OR VOMITING;  Start 9/7 /17 at 22:30


Aspirin (Aspirin) 81 mg DAILY PO  Last administered on 9/11/17at 09:23; Admin 

Dose 81 MG;  Start 9/8/17 at 09:00


Docusate Sodium (Colace) 100 mg Q12H  PRN PO CONSTIPATION;  Start 9/7/17 at 22:

30


Bisacodyl (Dulcolax) 5 mg DAILY  PRN PO CONSTIPATION;  Start 9/7/17 at 22:30


Famotidine (Pepcid) 20 mg Q12 PO  Last administered on 9/11/17at 09:26; Admin 

Dose 20 MG;  Start 9/8/17 at 09:00


Guaifenesin/ Dextromethorphan (Robitussin Dm Liquid Cup) 10 ml Q4H  PRN PO 

COUGH Last administered on 9/8/17at 09:08; Admin Dose 10 ML;  Start 9/7/17 at 22

:30


Furosemide (Lasix) 40 mg DAILY IV  Last administered on 9/11/17at 09:28; Admin 

Dose 40 MG;  Start 9/9/17 at 09:00


Lisinopril (Zestril) 5 mg BID PO  Last administered on 9/11/17at 09:27; Admin 

Dose 5 MG;  Start 9/9/17 at 09:00


Atorvastatin Calcium (Lipitor) 20 mg HS PO  Last administered on 9/10/17at 20:53

; Admin Dose 20 MG;  Start 9/8/17 at 21:00


Promethazine HCl/ Codeine (Phenergan/ Codeine) 5 ml Q4H  PRN PO COUGH Last 

administered on 9/11/17at 13:35; Admin Dose 5 ML;  Start 9/8/17 at 12:30


Acetaminophen (Tylenol Tab) 650 mg Q4H  PRN PO PAIN AND OR ELEVATED TEMP Last 

administered on 9/9/17at 22:38; Admin Dose 650 MG;  Start 9/9/17 at 01:30


Morphine Sulfate (morphine) 2 mg Q4H  PRN IV PAIN Last administered on 9/10/

17at 22:03; Admin Dose 2 MG;  Start 9/9/17 at 02:00


Carvedilol (Coreg) 6.25 mg BID PO  Last administered on 9/11/17at 09:28; Admin 

Dose 6.25 MG;  Start 9/9/17 at 21:00


Spironolactone (Aldactone) 25 mg DAILY PO  Last administered on 9/11/17at 09:26

; Admin Dose 25 MG;  Start 9/9/17 at 13:30


Levofloxacin (Levaquin) 750 mg DAILY@06 NGT  Last administered on 9/11/17at 05:

31; Admin Dose 750 MG;  Start 9/10/17 at 09:00











DALTON FIGUEROA Sep 11, 2017 15:00

## 2017-09-11 NOTE — CONS
Date/Time of Note


Date/Time of Note


DATE: 9/11/17 


TIME: 15:03





Assessment/Plan


Assessment/Plan


Additional Assessment/Plan


Acute decompensated systolic congestive heart failure


Cardiomyopathy with ejection fraction 25%


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


Moderate mitral valve regurgitation


Mild tricuspid valve regurgitation





-Patient with continued improvement in symptoms.  With switch diuretics to p.o.

  Continue beta-blocker and ACE inhibitor as blood pressure tolerates.  DC 

planning





Consultation Date/Type/Reason


Admit Date/Time


Sep 7, 2017 at 19:06


Initial Consult Date





Type of Consultation:  cv





24 HR Interval Summary


Free Text/Dictation


Patient with progressive improvement in shortness of breath, cough is better.  

Denies chest pain or dizziness





Exam/Review of Systems


Vital Signs


Vitals





 Vital Signs








  Date Time  Temp Pulse Resp B/P Pulse Ox O2 Delivery O2 Flow Rate FiO2


 


9/11/17 12:53  91      


 


9/11/17 11:19 98.7  20 102/69 95   














 Intake and Output   


 


 9/10/17 9/10/17 9/11/17





 15:00 23:00 07:00


 


Intake Total  1200 ml 900 ml


 


Balance  1200 ml 900 ml











Exam


No apparent distress


Constitutional:  alert, oriented


Head:  normocephalic


Respiratory:  other (Coarse breath sounds bilaterally, no wheezing)


Cardiovascular:  other (s1 S2 heard), regular rate and rhythm


Gastrointestinal:  bowel sounds, non-tender, soft


Extremities:  other (No edema)





Results


Result Diagram:  


9/11/17 0646                                                                   

             9/11/17 0646





Results 24 hrs





Laboratory Tests








Test


  9/11/17


06:46


 


White Blood Count 9.9  


 


Red Blood Count 5.00  


 


Hemoglobin 15.0  


 


Hematocrit 45.6  


 


Mean Corpuscular Volume 91.2  


 


Mean Corpuscular Hemoglobin 30.0  


 


Mean Corpuscular Hemoglobin


Concent 32.9  


 


 


Red Cell Distribution Width 12.0  


 


Platelet Count 276  


 


Mean Platelet Volume 10.7  H


 


Neutrophils % 66.6  


 


Lymphocytes % 17.3  


 


Monocytes % 14.0  H


 


Eosinophils % 0.8  


 


Basophils % 0.7  


 


Nucleated Red Blood Cells % 0.0  


 


Neutrophils # (Manual) 6.6  


 


Lymphocytes # 1.7  


 


Monocytes # 1.4  H


 


Eosinophils # 0.1  


 


Basophils # 0.1  


 


Nucleated Red Blood Cells # 0.0  


 


Sodium Level 136  


 


Potassium Level 4.1  


 


Chloride Level 98  


 


Carbon Dioxide Level 31  


 


Anion Gap 11  


 


Blood Urea Nitrogen 16  


 


Creatinine 1.10  


 


Glucose Level 97  #


 


Calcium Level 9.6  


 


Magnesium Level 2.1  











Medications


Medications





 Current Medications


Ondansetron HCl (Zofran Inj) 4 mg Q6H  PRN IV NAUSEA AND/OR VOMITING;  Start 9/7 /17 at 22:30


Aspirin (Aspirin) 81 mg DAILY PO  Last administered on 9/11/17at 09:23; Admin 

Dose 81 MG;  Start 9/8/17 at 09:00


Docusate Sodium (Colace) 100 mg Q12H  PRN PO CONSTIPATION;  Start 9/7/17 at 22:

30


Bisacodyl (Dulcolax) 5 mg DAILY  PRN PO CONSTIPATION;  Start 9/7/17 at 22:30


Famotidine (Pepcid) 20 mg Q12 PO  Last administered on 9/11/17at 09:26; Admin 

Dose 20 MG;  Start 9/8/17 at 09:00


Guaifenesin/ Dextromethorphan (Robitussin Dm Liquid Cup) 10 ml Q4H  PRN PO 

COUGH Last administered on 9/8/17at 09:08; Admin Dose 10 ML;  Start 9/7/17 at 22

:30


Furosemide (Lasix) 40 mg DAILY IV  Last administered on 9/11/17at 09:28; Admin 

Dose 40 MG;  Start 9/9/17 at 09:00


Lisinopril (Zestril) 5 mg BID PO  Last administered on 9/11/17at 09:27; Admin 

Dose 5 MG;  Start 9/9/17 at 09:00


Atorvastatin Calcium (Lipitor) 20 mg HS PO  Last administered on 9/10/17at 20:53

; Admin Dose 20 MG;  Start 9/8/17 at 21:00


Promethazine HCl/ Codeine (Phenergan/ Codeine) 5 ml Q4H  PRN PO COUGH Last 

administered on 9/11/17at 13:35; Admin Dose 5 ML;  Start 9/8/17 at 12:30


Acetaminophen (Tylenol Tab) 650 mg Q4H  PRN PO PAIN AND OR ELEVATED TEMP Last 

administered on 9/9/17at 22:38; Admin Dose 650 MG;  Start 9/9/17 at 01:30


Morphine Sulfate (morphine) 2 mg Q4H  PRN IV PAIN Last administered on 9/10/

17at 22:03; Admin Dose 2 MG;  Start 9/9/17 at 02:00


Carvedilol (Coreg) 6.25 mg BID PO  Last administered on 9/11/17at 09:28; Admin 

Dose 6.25 MG;  Start 9/9/17 at 21:00


Spironolactone (Aldactone) 25 mg DAILY PO  Last administered on 9/11/17at 09:26

; Admin Dose 25 MG;  Start 9/9/17 at 13:30


Levofloxacin (Levaquin) 750 mg DAILY@06 NGT  Last administered on 9/11/17at 05:

31; Admin Dose 750 MG;  Start 9/10/17 at 09:00











Mika Garcia DO Sep 11, 2017 15:05

## 2017-09-12 NOTE — CONS
Date/Time of Note


Date/Time of Note


DATE: 9/12/17 


TIME: 15:03





Assessment/Plan


Assessment/Plan


Additional Assessment/Plan





Acute decompensated systolic congestive heart failure


Cardiomyopathy with ejection fraction 25%


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


Moderate mitral valve regurgitation


Mild tricuspid valve regurgitation


Likely allergy to ACE inhibitor





-Patient with lip swelling today, concerning to reaction to ACE inhibitor.  

Would stop, switch to ARB.  Continue maintenance diuretics.  Continue Coreg as 

heart rate blood pressure permits.





Consultation Date/Type/Reason


Admit Date/Time


Sep 7, 2017 at 19:06


Type of Consultation:  cv





24 HR Interval Summary


Free Text/Dictation


Patient with swelling of his lower lip today.  Denies shortness of breath, 

still with cough





Exam/Review of Systems


Vital Signs


Vitals





 Vital Signs








  Date Time  Temp Pulse Resp B/P Pulse Ox O2 Delivery O2 Flow Rate FiO2


 


9/12/17 12:31  72      


 


9/12/17 11:34 98.4  16 102/61 96   














 Intake and Output   


 


 9/11/17 9/11/17 9/12/17





 15:00 23:00 07:00


 


Intake Total  1500 ml 800 ml


 


Balance  1500 ml 800 ml











Exam


Constitutional:  alert, oriented, well developed


Head:  normocephalic


ENMT:  other (Edema of lower lip)


Respiratory:  other (Coarse breath sounds bilaterally, no wheezing)


Cardiovascular:  other (S1-S2 heard), regular rate and rhythm


Gastrointestinal:  bowel sounds, non-tender, soft


Extremities:  other (No edema)





Results


Result Diagram:  


9/12/17 0727                                                                   

             9/12/17 0727





Results 24 hrs





Laboratory Tests








Test


  9/12/17


07:27


 


White Blood Count 10.6  


 


Red Blood Count 4.99  


 


Hemoglobin 15.3  


 


Hematocrit 46.1  


 


Mean Corpuscular Volume 92.4  


 


Mean Corpuscular Hemoglobin 30.7  


 


Mean Corpuscular Hemoglobin


Concent 33.2  


 


 


Red Cell Distribution Width 12.0  


 


Platelet Count 310  


 


Mean Platelet Volume 10.3  


 


Neutrophils % 67.8  


 


Lymphocytes % 15.9  


 


Monocytes % 14.1  H


 


Eosinophils % 0.9  


 


Basophils % 0.6  


 


Nucleated Red Blood Cells % 0.0  


 


Neutrophils # (Manual) 7.2  


 


Lymphocytes # 1.7  


 


Monocytes # 1.5  H


 


Eosinophils # 0.1  


 


Basophils # 0.1  


 


Nucleated Red Blood Cells # 0.0  


 


Sodium Level 136  


 


Potassium Level 4.6  


 


Chloride Level 97  


 


Carbon Dioxide Level 33  H


 


Anion Gap 11  


 


Blood Urea Nitrogen 18  


 


Creatinine 1.26  H


 


Glucose Level 93  


 


Calcium Level 9.6  











Medications


Medications





 Current Medications


Ondansetron HCl (Zofran Inj) 4 mg Q6H  PRN IV NAUSEA AND/OR VOMITING;  Start 9/7 /17 at 22:30


Aspirin (Aspirin) 81 mg DAILY PO  Last administered on 9/12/17at 09:29; Admin 

Dose 81 MG;  Start 9/8/17 at 09:00


Docusate Sodium (Colace) 100 mg Q12H  PRN PO CONSTIPATION;  Start 9/7/17 at 22:

30


Bisacodyl (Dulcolax) 5 mg DAILY  PRN PO CONSTIPATION;  Start 9/7/17 at 22:30


Famotidine (Pepcid) 20 mg Q12 PO  Last administered on 9/12/17at 09:29; Admin 

Dose 20 MG;  Start 9/8/17 at 09:00


Guaifenesin/ Dextromethorphan (Robitussin Dm Liquid Cup) 10 ml Q4H  PRN PO 

COUGH Last administered on 9/8/17at 09:08; Admin Dose 10 ML;  Start 9/7/17 at 22

:30


Lisinopril (Zestril) 5 mg BID PO  Last administered on 9/12/17at 09:29; Admin 

Dose 5 MG;  Start 9/9/17 at 09:00


Atorvastatin Calcium (Lipitor) 20 mg HS PO  Last administered on 9/11/17at 20:36

; Admin Dose 20 MG;  Start 9/8/17 at 21:00


Promethazine HCl/ Codeine (Phenergan/ Codeine) 5 ml Q4H  PRN PO COUGH Last 

administered on 9/12/17at 12:29; Admin Dose 5 ML;  Start 9/8/17 at 12:30


Acetaminophen (Tylenol Tab) 650 mg Q4H  PRN PO PAIN AND OR ELEVATED TEMP Last 

administered on 9/12/17at 05:22; Admin Dose 650 MG;  Start 9/9/17 at 01:30


Morphine Sulfate (morphine) 2 mg Q4H  PRN IV PAIN Last administered on 9/10/

17at 22:03; Admin Dose 2 MG;  Start 9/9/17 at 02:00


Carvedilol (Coreg) 6.25 mg BID PO  Last administered on 9/12/17at 09:31; Admin 

Dose 6.25 MG;  Start 9/9/17 at 21:00


Spironolactone (Aldactone) 25 mg DAILY PO  Last administered on 9/12/17at 09:29

; Admin Dose 25 MG;  Start 9/9/17 at 13:30


Levofloxacin (Levaquin) 750 mg DAILY@06 NGT  Last administered on 9/12/17at 05:

20; Admin Dose 750 MG;  Start 9/10/17 at 09:00


Furosemide (Lasix) 40 mg DAILY PO  Last administered on 9/12/17at 09:30; Admin 

Dose 40 MG;  Start 9/12/17 at 09:00











Mkia Garcia DO Sep 12, 2017 15:04

## 2017-09-12 NOTE — PN
Date/Time of Note


Date/Time of Note


DATE: 9/12/17 


TIME: 15:20





Assessment/Plan


VTE Prophylaxis


VTE Prophylaxis Intervention:  SCD's





Lines/Catheters


IV Catheter Type (from [facility]):  Saline Lock


Urinary Cath still in place:  No





Assessment/Plan


Chief Complaint/Hosp Course


Acute systolic CHF exacerbation: Improved


- Likely nonischemic, perhaps a myocarditis in setting of URI.  Should have 

ischemic evaluation per cardiology at some point


-Continue diuresis


- BB, ACE held secondary to angioedema and started on an ARB


-ICD evaluation as an outpatient





Community acquired pneumonia


-Continue Levaquin





Angioedema secondary to ACE inhibitor


-ACE inhibitor held and switched to [place] ARB


-Monitor overnight





Discharge: DC in a.m. if angioedema improved


Needs cardiologist who will accept [facility]





Prophylaxis: SCDs


Problems:  





Subjective


24 Hr Interval Summary


Respiratory:  cough





Exam/Review of Systems


Vital Signs


Vitals





 Vital Signs








  Date Time  Temp Pulse Resp B/P Pulse Ox O2 Delivery O2 Flow Rate FiO2


 


9/12/17 12:31  72      


 


9/12/17 11:34 98.4  16 102/61 96   














 Intake and Output   


 


 9/11/17 9/11/17 9/12/17





 15:00 23:00 07:00


 


Intake Total  1500 ml 800 ml


 


Balance  1500 ml 800 ml











Exam


Constitutional:  alert, oriented


ENMT:  other (Lip swelling)


Respiratory:  clear to auscultation


Cardiovascular:  regular rate and rhythm


Gastrointestinal:  soft, 


   No distended


Musculoskeletal:  nl extremities to inspection





Results


Result Diagram:  


9/12/17 0727 9/12/17 0727





Results 24 hrs





Laboratory Tests








Test


  9/12/17


07:27


 


White Blood Count 10.6  


 


Red Blood Count 4.99  


 


Hemoglobin 15.3  


 


Hematocrit 46.1  


 


Mean Corpuscular Volume 92.4  


 


Mean Corpuscular Hemoglobin 30.7  


 


Mean Corpuscular Hemoglobin


Concent 33.2  


 


 


Red Cell Distribution Width 12.0  


 


Platelet Count 310  


 


Mean Platelet Volume 10.3  


 


Neutrophils % 67.8  


 


Lymphocytes % 15.9  


 


Monocytes % 14.1  H


 


Eosinophils % 0.9  


 


Basophils % 0.6  


 


Nucleated Red Blood Cells % 0.0  


 


Neutrophils # (Manual) 7.2  


 


Lymphocytes # 1.7  


 


Monocytes # 1.5  H


 


Eosinophils # 0.1  


 


Basophils # 0.1  


 


Nucleated Red Blood Cells # 0.0  


 


Sodium Level 136  


 


Potassium Level 4.6  


 


Chloride Level 97  


 


Carbon Dioxide Level 33  H


 


Anion Gap 11  


 


Blood Urea Nitrogen 18  


 


Creatinine 1.26  H


 


Glucose Level 93  


 


Calcium Level 9.6  











Medications


Medications





 Current Medications


Ondansetron HCl (Zofran Inj) 4 mg Q6H  PRN IV NAUSEA AND/OR VOMITING;  Start 9/7 /17 at 22:30


Aspirin (Aspirin) 81 mg DAILY PO  Last administered on 9/12/17at 09:29; Admin 

Dose 81 MG;  Start 9/8/17 at 09:00


Docusate Sodium (Colace) 100 mg Q12H  PRN PO CONSTIPATION;  Start 9/7/17 at 22:

30


Bisacodyl (Dulcolax) 5 mg DAILY  PRN PO CONSTIPATION;  Start 9/7/17 at 22:30


Famotidine (Pepcid) 20 mg Q12 PO  Last administered on 9/12/17at 09:29; Admin 

Dose 20 MG;  Start 9/8/17 at 09:00


Guaifenesin/ Dextromethorphan (Robitussin Dm Liquid Cup) 10 ml Q4H  PRN PO 

COUGH Last administered on 9/8/17at 09:08; Admin Dose 10 ML;  Start 9/7/17 at 22

:30


Atorvastatin Calcium (Lipitor) 20 mg HS PO  Last administered on 9/11/17at 20:36

; Admin Dose 20 MG;  Start 9/8/17 at 21:00


Promethazine HCl/ Codeine (Phenergan/ Codeine) 5 ml Q4H  PRN PO COUGH Last 

administered on 9/12/17at 12:29; Admin Dose 5 ML;  Start 9/8/17 at 12:30


Acetaminophen (Tylenol Tab) 650 mg Q4H  PRN PO PAIN AND OR ELEVATED TEMP Last 

administered on 9/12/17at 05:22; Admin Dose 650 MG;  Start 9/9/17 at 01:30


Morphine Sulfate (morphine) 2 mg Q4H  PRN IV PAIN Last administered on 9/10/

17at 22:03; Admin Dose 2 MG;  Start 9/9/17 at 02:00


Carvedilol (Coreg) 6.25 mg BID PO  Last administered on 9/12/17at 09:31; Admin 

Dose 6.25 MG;  Start 9/9/17 at 21:00


Spironolactone (Aldactone) 25 mg DAILY PO  Last administered on 9/12/17at 09:29

; Admin Dose 25 MG;  Start 9/9/17 at 13:30


Levofloxacin (Levaquin) 750 mg DAILY@06 NGT  Last administered on 9/12/17at 05:

20; Admin Dose 750 MG;  Start 9/10/17 at 09:00


Furosemide (Lasix) 40 mg DAILY PO  Last administered on 9/12/17at 09:30; Admin 

Dose 40 MG;  Start 9/12/17 at 09:00


Losartan Potassium (Cozaar) 12.5 mg DAILY PO ;  Start 9/13/17 at 09:00











DALTON FIGUEROA Sep 12, 2017 15:23

## 2017-09-13 NOTE — CONS
Date/Time of Note


Date/Time of Note


DATE: 9/13/17 


TIME: 19:54





Assessment/Plan


Assessment/Plan


Additional Assessment/Plan


Acute decompensated systolic congestive heart failure


Cardiomyopathy with ejection fraction 25%


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


Moderate mitral valve regurgitation


Mild tricuspid valve regurgitation


Likely allergy to ACE inhibitor





-Lip swelling has resolved.  Given blood pressure on the lower end, would 

decrease Lasix to 20 mg maintenance dose, Coreg could be decreased.  Continue 

ARB





Consultation Date/Type/Reason


Admit Date/Time


Sep 7, 2017 at 19:06


Type of Consultation:  cv





24 HR Interval Summary


Free Text/Dictation


Feeling better, as of breath, chest pain, lip swelling has resolved





Exam/Review of Systems


Vital Signs


Vitals





 Vital Signs








  Date Time  Temp Pulse Resp B/P Pulse Ox O2 Delivery O2 Flow Rate FiO2


 


9/13/17 16:14  77      


 


9/13/17 15:35 98.4  19 110/78 97   














 Intake and Output   


 


 9/12/17 9/12/17 9/13/17





 15:00 23:00 07:00


 


Intake Total  500 ml 


 


Balance  500 ml 











Exam


No apparent distress


Constitutional:  alert, oriented


Head:  normocephalic


Respiratory:  other (Coarse breath sounds bilaterally, mild expiratory wheezing)


Cardiovascular:  other (S1-S2 heard), regular rate and rhythm


Gastrointestinal:  bowel sounds, non-tender, soft


Extremities:  other (No edema)





Results


Result Diagram:  


9/13/17 0647                                                                   

             9/13/17 0647





Results 24 hrs





Laboratory Tests








Test


  9/13/17


06:47


 


White Blood Count 10.8  


 


Red Blood Count 5.22  


 


Hemoglobin 15.3  


 


Hematocrit 47.7  


 


Mean Corpuscular Volume 91.4  


 


Mean Corpuscular Hemoglobin 29.3  


 


Mean Corpuscular Hemoglobin


Concent 32.1  


 


 


Red Cell Distribution Width 12.2  


 


Platelet Count 364  


 


Mean Platelet Volume 10.2  


 


Neutrophils % 66.3  


 


Lymphocytes % 16.8  


 


Monocytes % 14.5  H


 


Eosinophils % 0.7  


 


Basophils % 0.7  


 


Nucleated Red Blood Cells % 0.0  


 


Neutrophils # (Manual) 7.2  


 


Lymphocytes # 1.8  


 


Monocytes # 1.6  H


 


Eosinophils # 0.1  


 


Basophils # 0.1  


 


Nucleated Red Blood Cells # 0.0  


 


Sodium Level 137  


 


Potassium Level 4.8  


 


Chloride Level 98  


 


Carbon Dioxide Level 31  


 


Anion Gap 13  


 


Blood Urea Nitrogen 19  


 


Creatinine 1.27  H


 


Glucose Level 93  


 


Calcium Level 9.7  

















Mika Garcia DO Sep 13, 2017 19:55

## 2017-09-13 NOTE — PDOCDIS
Discharge Instructions


CONDITION


Patient Condition:  Good





HOME CARE INSTRUCTIONS:


Diet Instructions:  Reduced Sodium





ACTIVITY:








Activity Restrictions:   No Restrictions











FOLLOW UP/APPOINTMENTS


Follow-up Plan


F/U WITH DR VARELA OF CARDIOLOGY IN 1-2 WEEKS, F/U WITH A NEW PCP- DR JACOB LAGUERRE IN 1-2 WEEKS, PLEASE CALL OFFICES FOR AN APPOINTMENT











DALTON FIGUEROA Sep 13, 2017 15:18

## 2017-09-13 NOTE — DS
Date/Time of Note


Date/Time of Note


DATE: 9/13/17 


TIME: 18:03





Discharge Summary


Admission/Discharge Info


Admit Date/Time


Sep 7, 2017 at 19:06


Discharge Date/Time


September 13, 2017


Discharge Diagnosis


Acute systolic CHF exacerbation: Resolved


- Likely nonischemic, perhaps a myocarditis in setting of URI


-Patient to have ischemic evaluation per cardiology at some point


-DC with beta-blocker, ARB, aspirin and Lasix


-ACE held secondary to angioedema and started on an ARB


-ICD evaluation as an outpatient





Community acquired pneumonia-improved


-DC with Levaquin





Angioedema secondary to ACE inhibitor-resolved


-ACE inhibitor held and switched to an ARB


Patient Condition:  Good


Hospital Course


Patient is a 62 year old male with no medical history who was transferred from 

[facility] for new onset chf.  Patient had a cough for about 1 week chest x-ray showed 

pneumonia in the right lung.  Echo showed an EF 25% with stage II diastolic 

heart failure.  Patient was seen by cardiology was felt that he likely has a 

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 

need ICD evaluation and ischemic evaluation as an outpatient.  Patient's 

shortness of breath did improve with antibiotics and diuretics, of note he did 

have swelling in his lips felt to be angioedema from ACE inhibitor and ACE 

inhibitor was held with patient having resolution of lip swelling.  Patient was 

switched to an ARB which he tolerated well.  Patient was felt to be stable for 

discharge and on the day of discharge patient vitals, labs and physical exam 

are stable, he had no further acute complaints and questions are answered.  

Patient was advised about  medication compliance.


Home Meds


Active Scripts


Aspirin (Aspirin) 81 Mg Chew, 81 MG PO DAILY, #90 TAB


   Prov:NAPOLEON FIGUEROAYOUSUF         9/13/17


Levofloxacin* (Levaquin*) 750 Mg Tablet, 750 MG PO DAILY for 3 Days, TAB


   Prov:CAROLINADALTON         9/13/17


Carvedilol* (Coreg*) 3.125 Mg Tablet, 3.125 MG PO BID, #60 TAB 1 Refill


   Prov:CAROLINADALTON         9/13/17


Furosemide* (Furosemide*) 20 Mg Tablet, 20 MG PO DAILY, #60 TAB 1 Refill


   Prov:CAROLINADALTON         9/13/17


Losartan Potassium* (Cozaar*) 25 Mg Tablet, 12.5 MG PO DAILY, #60 TAB 1 Refill


   Prov:DALTON FIGUEROA         9/13/17


Follow-up Plan


F/U WITH DR VARELA OF CARDIOLOGY IN 1-2 WEEKS, F/U WITH A NEW PCP- DR JACOB LAGUERRE IN 1-2 WEEKS, PLEASE CALL OFFICES FOR AN APPOINTMENT


Primary Care Provider


Care Physician No Primary


Time spent on discharge:   > 30 minutes











DALTON FIGUEROA Sep 13, 2017 18:09

## 2018-01-18 ENCOUNTER — HOSPITAL ENCOUNTER (OUTPATIENT)
Age: 63
Setting detail: OBSERVATION
LOS: 1 days | Discharge: HOME | End: 2018-01-19

## 2018-01-18 ENCOUNTER — HOSPITAL ENCOUNTER (OUTPATIENT)
Dept: HOSPITAL 91 - SDS | Age: 63
Setting detail: OBSERVATION
LOS: 1 days | Discharge: HOME | End: 2018-01-19
Payer: COMMERCIAL

## 2018-01-18 DIAGNOSIS — I11.0: ICD-10-CM

## 2018-01-18 DIAGNOSIS — K40.30: Primary | ICD-10-CM

## 2018-01-18 DIAGNOSIS — I50.9: ICD-10-CM

## 2018-01-18 PROCEDURE — 49507 PRP I/HERN INIT BLOCK >5 YR: CPT

## 2018-01-18 PROCEDURE — 93005 ELECTROCARDIOGRAM TRACING: CPT

## 2018-01-18 PROCEDURE — 97162 PT EVAL MOD COMPLEX 30 MIN: CPT

## 2018-01-18 RX ADMIN — BACITRACIN 1 ML: 50000 INJECTION, POWDER, FOR SOLUTION INTRAMUSCULAR at 14:17

## 2018-01-18 RX ADMIN — THIAMINE HYDROCHLORIDE 1 MLS/HR: 100 INJECTION, SOLUTION INTRAMUSCULAR; INTRAVENOUS at 20:05

## 2018-01-18 RX ADMIN — BUPIVACAINE HYDROCHLORIDE 1 ML: 2.5 INJECTION, SOLUTION EPIDURAL; INFILTRATION; INTRACAUDAL; PERINEURAL at 14:16

## 2018-01-18 RX ADMIN — HYDROCODONE BITARTRATE AND ACETAMINOPHEN 1 TAB: 5; 325 TABLET ORAL at 20:59

## 2018-01-19 RX ADMIN — HYDROCODONE BITARTRATE AND ACETAMINOPHEN 1 TAB: 5; 325 TABLET ORAL at 14:57

## 2018-01-19 RX ADMIN — ASPIRIN 1 MG: 81 TABLET, COATED ORAL at 08:50

## 2018-01-19 RX ADMIN — HYDROCODONE BITARTRATE AND ACETAMINOPHEN 1 TAB: 5; 325 TABLET ORAL at 08:49

## 2018-01-19 RX ADMIN — MORPHINE SULFATE 1 MG: 2 INJECTION, SOLUTION INTRAMUSCULAR; INTRAVENOUS at 02:05

## 2018-01-19 RX ADMIN — PANTOPRAZOLE SODIUM 1 MG: 40 TABLET, DELAYED RELEASE ORAL at 05:49

## 2018-01-19 RX ADMIN — FUROSEMIDE 1 MG: 20 TABLET ORAL at 08:50

## 2018-06-13 ENCOUNTER — HOSPITAL ENCOUNTER (OUTPATIENT)
Dept: HOSPITAL 91 - SDS | Age: 63
Discharge: HOME | End: 2018-06-13
Payer: COMMERCIAL

## 2018-06-13 ENCOUNTER — HOSPITAL ENCOUNTER (OUTPATIENT)
Age: 63
Discharge: HOME | End: 2018-06-13

## 2018-06-13 DIAGNOSIS — M20.21: Primary | ICD-10-CM

## 2018-06-13 DIAGNOSIS — M19.071: ICD-10-CM

## 2018-06-13 DIAGNOSIS — I10: ICD-10-CM

## 2018-06-13 DIAGNOSIS — I50.9: ICD-10-CM

## 2018-06-13 DIAGNOSIS — D16.31: ICD-10-CM

## 2018-06-13 DIAGNOSIS — E78.5: ICD-10-CM

## 2018-06-13 PROCEDURE — 28291 CORRJ HALUX RIGDUS W/IMPLT: CPT

## 2018-06-13 PROCEDURE — 88304 TISSUE EXAM BY PATHOLOGIST: CPT

## 2018-06-13 PROCEDURE — 88311 DECALCIFY TISSUE: CPT

## 2018-06-13 RX ADMIN — BACITRACIN 1 ML: 50000 INJECTION, POWDER, FOR SOLUTION INTRAMUSCULAR at 11:11

## 2018-06-13 RX ADMIN — HYDROMORPHONE HYDROCHLORIDE 1 MG: 2 INJECTION INTRAMUSCULAR; INTRAVENOUS; SUBCUTANEOUS at 13:28

## 2018-06-13 RX ADMIN — OXYCODONE HYDROCHLORIDE AND ACETAMINOPHEN 1 TAB: 5; 325 TABLET ORAL at 13:58

## 2018-06-13 RX ADMIN — BUPIVACAINE HYDROCHLORIDE 1 ML: 2.5 INJECTION, SOLUTION EPIDURAL; INFILTRATION; INTRACAUDAL; PERINEURAL at 11:11

## 2022-11-08 ENCOUNTER — HOSPITAL ENCOUNTER (EMERGENCY)
Dept: HOSPITAL 87 - ER | Age: 67
Discharge: HOME | End: 2022-11-08
Payer: MEDICARE

## 2022-11-08 VITALS — HEIGHT: 71 IN | BODY MASS INDEX: 27.47 KG/M2 | WEIGHT: 196.21 LBS

## 2022-11-08 VITALS — DIASTOLIC BLOOD PRESSURE: 83 MMHG | SYSTOLIC BLOOD PRESSURE: 133 MMHG

## 2022-11-08 DIAGNOSIS — J06.9: ICD-10-CM
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
## 2022-11-08 DIAGNOSIS — M70.32: Primary | ICD-10-CM

## 2022-11-08 DIAGNOSIS — I10: ICD-10-CM

## 2022-11-08 PROCEDURE — 73080 X-RAY EXAM OF ELBOW: CPT

## 2022-11-08 PROCEDURE — 99284 EMERGENCY DEPT VISIT MOD MDM: CPT

## 2022-11-08 PROCEDURE — 71045 X-RAY EXAM CHEST 1 VIEW: CPT
